# Patient Record
(demographics unavailable — no encounter records)

---

## 2024-12-13 NOTE — PHYSICAL EXAM
[de-identified] : His MRI of his cervical spine was not impressive. There was a small disc protrusion at C6-7, but it didn't appear neurocompressive. It did not contact the cord.

## 2024-12-13 NOTE — HISTORY OF PRESENT ILLNESS
[de-identified] : Bennie Bower is having worsening bilateral hand numbness, both on the ulnar as well as radial aspect of the hand. He's doing physiotherapy.

## 2024-12-13 NOTE — ADDENDUM
[FreeTextEntry1] : I, Fidelina Boo (scribe) assisted in filling out this chart under the dictation of Jim Coburn on 12/12/2024

## 2024-12-13 NOTE — HISTORY OF PRESENT ILLNESS
[de-identified] : Bennie Bower is having worsening bilateral hand numbness, both on the ulnar as well as radial aspect of the hand. He's doing physiotherapy.

## 2024-12-13 NOTE — PLAN
[TextEntry] : I recommend that he continue with physiotherapy. In the interim, we will order an EMG and a neurological consultation. He'll follow up in four weeks' time. If I am unable to ascertain the source of the symptoms, he will be referred to a hand specialist for further evaluation.

## 2024-12-13 NOTE — PHYSICAL EXAM
[de-identified] : His MRI of his cervical spine was not impressive. There was a small disc protrusion at C6-7, but it didn't appear neurocompressive. It did not contact the cord.

## 2024-12-13 NOTE — END OF VISIT
[FreeTextEntry3] : Documented by Fidelina Boo acting as a scribe for Jim Coburn on 12/12/2024   All medical record entries made by the Scribe were at my, Dr. Jim Coburn direction and personally dictated by me on 12/12/2024 . I have reviewed the chart and agree that the record accurately reflects my personal performance of the history, physical exam, assessment and plan. I have also personally directed, reviewed, and agreed with the chart.

## 2025-01-31 NOTE — REASON FOR VISIT
[Follow-Up Visit] : a follow-up visit for [Back Pain] : back pain [Neck Pain] : neck pain [Radiculopathy] : radiculopathy 0 (no pain/absence of nonverbal indicators of pain)